# Patient Record
Sex: MALE | Race: BLACK OR AFRICAN AMERICAN | Employment: UNEMPLOYED | URBAN - METROPOLITAN AREA
[De-identification: names, ages, dates, MRNs, and addresses within clinical notes are randomized per-mention and may not be internally consistent; named-entity substitution may affect disease eponyms.]

---

## 2018-01-03 ENCOUNTER — APPOINTMENT (OUTPATIENT)
Dept: GENERAL RADIOLOGY | Age: 33
End: 2018-01-03
Attending: PHYSICIAN ASSISTANT
Payer: SELF-PAY

## 2018-01-03 ENCOUNTER — HOSPITAL ENCOUNTER (EMERGENCY)
Age: 33
Discharge: HOME | End: 2018-01-03
Attending: EMERGENCY MEDICINE
Payer: SELF-PAY

## 2018-01-03 VITALS
HEART RATE: 61 BPM | TEMPERATURE: 97.5 F | WEIGHT: 145 LBS | RESPIRATION RATE: 16 BRPM | HEIGHT: 66 IN | DIASTOLIC BLOOD PRESSURE: 63 MMHG | OXYGEN SATURATION: 98 % | SYSTOLIC BLOOD PRESSURE: 113 MMHG | BODY MASS INDEX: 23.3 KG/M2

## 2018-01-03 DIAGNOSIS — S43.101A SEPARATION OF RIGHT ACROMIOCLAVICULAR JOINT, INITIAL ENCOUNTER: Primary | ICD-10-CM

## 2018-01-03 PROCEDURE — 74011250637 HC RX REV CODE- 250/637: Performed by: PHYSICIAN ASSISTANT

## 2018-01-03 PROCEDURE — 73030 X-RAY EXAM OF SHOULDER: CPT

## 2018-01-03 PROCEDURE — 99283 EMERGENCY DEPT VISIT LOW MDM: CPT

## 2018-01-03 PROCEDURE — A4565 SLINGS: HCPCS

## 2018-01-03 RX ORDER — DICLOFENAC EPOLAMINE 0.01 G/1
1 PATCH TOPICAL EVERY 12 HOURS
Qty: 10 PATCH | Refills: 0 | Status: SHIPPED | OUTPATIENT
Start: 2018-01-03

## 2018-01-03 RX ORDER — OXYCODONE AND ACETAMINOPHEN 5; 325 MG/1; MG/1
1 TABLET ORAL
Status: COMPLETED | OUTPATIENT
Start: 2018-01-03 | End: 2018-01-03

## 2018-01-03 RX ADMIN — OXYCODONE HYDROCHLORIDE AND ACETAMINOPHEN 1 TABLET: 5; 325 TABLET ORAL at 01:50

## 2018-01-03 NOTE — LETTER
[address] [place] [address] Dr [facility] EMERGENCY DEPT 
[address] [facility] [id number] [phone] Work/School Note Date: 1/2/2018 To Whom It May concern: 
 
Dayne Perdue was seen and treated today in the emergency room by the following provider(s): 
Attending Provider: Vivian Hernandez MD 
Physician Assistant: Wayne Cummings PA-C. Dayne Perdue return to work 1/7/18 Sincerely, Wayne Cummings PA-C

## 2018-01-03 NOTE — ED NOTES
I have reviewed discharge instructions with the patient and caregiver. The patient and caregiver verbalized understanding. Patient armband removed and given to patient to take home. Patient was informed of the privacy risks if armband lost or stolen. I have reviewed the provider's instructions with the patient, answering all questions to his satisfaction.

## 2018-01-03 NOTE — DISCHARGE INSTRUCTIONS
Shoulder Separation: Care Instructions  Your Care Instructions    A shoulder separation is a tearing of the ligaments that connect two bones of the shoulder-the collarbone (clavicle) and the end of the shoulder blade (acromion). The ligaments can be partially or completely torn. This is usually caused by a blow to the top of the shoulder or a fall onto an outstretched arm. Shoulder injuries can be slow to heal, but with time and effort, your shoulder should get better. Physical therapy can help you regain strength, motion, and flexibility in your shoulder. Follow-up care is a key part of your treatment and safety. Be sure to make and go to all appointments, and call your doctor if you are having problems. It's also a good idea to know your test results and keep a list of the medicines you take. How can you care for yourself at home? · If your doctor put your arm in a sling, wear the sling as directed. Do not take it off before your doctor tells you to. · Take pain medicines exactly as directed. ¨ If the doctor gave you a prescription medicine for pain, take it as prescribed. ¨ If you are not taking a prescription pain medicine, ask your doctor if you can take an over-the-counter medicine. · Rest your shoulder as much as you can. · Put ice or a cold pack on your shoulder for 10 to 20 minutes at a time. Try to do this every 1 to 2 hours for the next 3 days (when you are awake) or until the swelling goes down. Put a thin cloth between the ice and your skin. · You may use warm packs after the first 3 days for 15 to 20 minutes at a time to ease pain. · If your doctor gave you exercises to do at home, do them exactly as instructed. · Do not do anything that makes pain worse. · Go to all follow-up appointments. You and your doctor will decide if you need further treatment, including surgery. You and your doctor will also decide when to begin physical therapy, if it is needed.   When should you call for help?  Call your doctor now or seek immediate medical care if:  ? · Your pain gets a lot worse. ? · You cannot move your arm. ? · You have new weakness, numbness, or tingling in your hand or arm. ? · Your arm or hand is cool or pale or changes color. ? · Your sling feels too tight, and you cannot loosen it. ? Watch closely for changes in your health, and be sure to contact your doctor if:  ? · You have new or increased swelling in your arm. ? · You have new pain that develops in another area of your arm. For example, you have pain in your hand or elbow. ? · You do not get better as expected. Where can you learn more? Go to http://dayanara-onelia.info/. Enter S051 in the search box to learn more about \"Shoulder Separation: Care Instructions. \"  Current as of: March 21, 2017  Content Version: 11.4  © 2087-7958 [employer]. Care instructions adapted under license by [employer] (which disclaims liability or warranty for this information). If you have questions about a medical condition or this instruction, always ask your healthcare professional. [place] [address] any warranty or liability for your use of this information. Shoulder Pain: Care Instructions  Your Care Instructions    You can hurt your shoulder by using it too much during an activity, such as fishing or baseball. It can also happen as part of the everyday wear and tear of getting older. Shoulder injuries can be slow to heal, but your shoulder should get better with time. Your doctor may recommend a sling to rest your shoulder. If you have injured your shoulder, you may need testing and treatment. Follow-up care is a key part of your treatment and safety. Be sure to make and go to all appointments, and call your doctor if you are having problems. It's also a good idea to know your test results and keep a list of the medicines you take.   How can you care for yourself at home?  · Take pain medicines exactly as directed. ¨ If the doctor gave you a prescription medicine for pain, take it as prescribed. ¨ If you are not taking a prescription pain medicine, ask your doctor if you can take an over-the-counter medicine. ¨ Do not take two or more pain medicines at the same time unless the doctor told you to. Many pain medicines contain acetaminophen, which is Tylenol. Too much acetaminophen (Tylenol) can be harmful. · If your doctor recommends that you wear a sling, use it as directed. Do not take it off before your doctor tells you to. · Put ice or a cold pack on the sore area for 10 to 20 minutes at a time. Put a thin cloth between the ice and your skin. · If there is no swelling, you can put moist heat, a heating pad, or a warm cloth on your shoulder. Some doctors suggest alternating between hot and cold. · Rest your shoulder for a few days. If your doctor recommends it, you can then begin gentle exercise of the shoulder, but do not lift anything heavy. When should you call for help? Call 911 anytime you think you may need emergency care. For example, call if:  ? · You have chest pain or pressure. This may occur with:  ¨ Sweating. ¨ Shortness of breath. ¨ Nausea or vomiting. ¨ Pain that spreads from the chest to the neck, jaw, or one or both shoulders or arms. ¨ Dizziness or lightheadedness. ¨ A fast or uneven pulse. After calling 911, chew 1 adult-strength aspirin. Wait for an ambulance. Do not try to drive yourself. ? · Your arm or hand is cool or pale or changes color. ?Call your doctor now or seek immediate medical care if:  ? · You have signs of infection, such as:  ¨ Increased pain, swelling, warmth, or redness in your shoulder. ¨ Red streaks leading from a place on your shoulder. ¨ Pus draining from an area of your shoulder. ¨ Swollen lymph nodes in your neck, armpits, or groin. ¨ A fever. ? Watch closely for changes in your health, and be sure to contact your doctor if:  ? · You cannot use your shoulder. ? · Your shoulder does not get better as expected. Where can you learn more? Go to http://dayanara-onelia.info/. Enter S448 in the search box to learn more about \"Shoulder Pain: Care Instructions. \"  Current as of: March 21, 2017  Content Version: 11.4  © 6739-3071 [employer]. Care instructions adapted under license by [employer] (which disclaims liability or warranty for this information). If you have questions about a medical condition or this instruction, always ask your healthcare professional. [place] [address] any warranty or liability for your use of this information.

## 2018-01-03 NOTE — ED PROVIDER NOTES
HPI Comments: 31yo M presenting to ED c/o right shoulder pain s/p fall while climbing fence. Pt reportedly landed on lateral shoulder and has had pain since. Limited ROM due to pain. No head injury. Denies f/c, weakness, dizziness/headache, LOC, confusion, neck pain, stiff neck, cp, palps,, cough, abd pain, n/v/d, back pain, extremity (unilateral) numbness/tingling/weakness, calf swelling/pain. Patient is a 34 y.o. male presenting with dislocation. The history is provided by the patient. Dislocation          No past medical history on file. No past surgical history on file. No family history on file. Social History     Social History    Marital status: SINGLE     Spouse name: N/A    Number of children: N/A    Years of education: N/A     Occupational History    Not on file. Social History Main Topics    Smoking status: Current Every Day Smoker     Packs/day: 0.50     Years: 5.00    Smokeless tobacco: Not on file    Alcohol use Yes      Comment:   liquor, 35   shots per week    Drug use: No    Sexual activity: Yes     Partners: Female     Birth control/ protection: Condom     Other Topics Concern    Not on file     Social History Narrative    No narrative on file         ALLERGIES: Review of patient's allergies indicates no known allergies. Review of Systems    Vitals:    01/02/18 2302   BP: 121/70   Pulse: 70   Resp: 16   Temp: 97.5 °F (36.4 °C)   SpO2: 100%   Weight: 65.8 kg (145 lb)   Height: 5' 6\" (1.676 m)            Physical Exam   Constitutional: He is oriented to person, place, and time. He appears well-developed and well-nourished. HENT:   Head: Normocephalic and atraumatic. Mouth/Throat: Oropharynx is clear and moist.   Eyes: Conjunctivae are normal.   Neck: Normal range of motion. Cardiovascular: Normal rate, regular rhythm and normal heart sounds. Pulmonary/Chest: Effort normal. No respiratory distress. He has no wheezes. He has no rales. Abdominal: Soft.  He exhibits no distension. Musculoskeletal:        Right shoulder: He exhibits decreased range of motion and bony tenderness. He exhibits no swelling, no effusion, no crepitus, no deformity, no laceration, no pain, no spasm, normal pulse and normal strength. Radial pulse 2+ and sym. Normal sensation. Laterosuperior shoulder TTP. No deformity appreciated. Neurological: He is oriented to person, place, and time. Nursing note and vitals reviewed. MDM  Number of Diagnoses or Management Options  AC separation, type 2, left, initial encounter:     ED Course       Procedures    Medications ordered:   Medications   oxyCODONE-acetaminophen (PERCOCET) 5-325 mg per tablet 1 Tab (1 Tab Oral Given 1/3/18 0150)         Lab findings:  No results found for this or any previous visit (from the past 12 hour(s)). X-Ray, CT or other radiology findings or impressions:  Xr Shoulder Rt Ap/lat Min 2 V    Result Date: 1/3/2018  Procedure:  Right shoulder series. Indication:  Kathrynn Found from a fence and dislocated shoulder. Felt a pop upon impact. Comparison:  None. Findings:  Internal rotation AP view and scapula Y view are submitted. No evidence of acute fracture or subluxation is identified in the glenohumeral joint. Instead, the acromioclavicular joint appears abnormally widened, measuring up to about 1.0 cm in maximal width. There is also associated slight step-off. Impression: 1. No evidence of glenohumeral joint dislocation. 2.  Abnormal AC joint widening and slight joint alignment step-off. Suggestive of grade 2-3 separation. Progress notes, Consult notes or additional Procedure notes:   Xray reviewed with Dr. Janessa Rock- agrees with plan to put arm in sling and have pt f/u with ortho in 2-3 days. Pt given sling with instructions and will f/u as directed. Dispo:  Patient was d/c in stable condition.       Return to the ER if you are unable to obtain referral as directed.        Jolie Gustafson 's results have been reviewed with him. They have been counseled regarding the diagnosis, treatment, and plan. They verbally convey understanding and agreement of the signs, symptoms, diagnosis, treatment and prognosis and additionally agrees to follow up as discussed. They also agrees with the care-plan and conveys that all of her questions have been answered. I have also provided discharge instructions for her that include: educational information regarding their diagnosis and treatment, and list of reasons why they would want to return to the ED prior to their follow-up appointment, should the condition change. Eliel Landeros PA-C    Diagnosis:   1. AC separation, type 2, left, initial encounter        Follow-up Information     Follow up With Details Comments Kayli Corral MD Schedule an appointment as soon as possible for a visit in 1 week  [facility].      [facility] EMERGENCY DEPT  As needed, If symptoms worsen [address] [facility] [address]  [phone]           Patient's Medications   Start Taking    DICLOFENAC (FLECTOR) 1.3 % PT12    1 Patch by TransDERmal route every twelve (12) hours every twelve (12) hours. Continue Taking    IBUPROFEN (MOTRIN) 200 MG TABLET    Take 200 mg by mouth. MUPIROCIN (BACTROBAN) 2 % OINTMENT    Apply  to affected area two (2) times a day. OXYCODONE-ACETAMINOPHEN (PERCOCET) 5-325 MG PER TABLET    Take 1 Tab by mouth every four (4) hours as needed (BREAKTHROUGH PAIN ONLY, do not fill unless Bactrim DS and Keflex are filled. ).    These Medications have changed    No medications on file   Stop Taking    No medications on file

## 2018-01-09 ENCOUNTER — OFFICE VISIT (OUTPATIENT)
Dept: ORTHOPEDIC SURGERY | Age: 33
End: 2018-01-09

## 2018-01-09 VITALS
SYSTOLIC BLOOD PRESSURE: 126 MMHG | BODY MASS INDEX: 24.11 KG/M2 | HEART RATE: 63 BPM | HEIGHT: 66 IN | TEMPERATURE: 97.8 F | DIASTOLIC BLOOD PRESSURE: 75 MMHG | WEIGHT: 150 LBS | OXYGEN SATURATION: 99 %

## 2018-01-09 DIAGNOSIS — S43.101A ACROMIOCLAVICULAR JOINT SEPARATION, TYPE 2, RIGHT, INITIAL ENCOUNTER: Primary | ICD-10-CM

## 2018-01-09 RX ORDER — HYDROCODONE BITARTRATE AND ACETAMINOPHEN 5; 325 MG/1; MG/1
1 TABLET ORAL
Qty: 25 TAB | Refills: 0 | Status: SHIPPED | OUTPATIENT
Start: 2018-01-09

## 2018-01-09 NOTE — PROGRESS NOTES
Andriy Hunt  1985   Chief Complaint   Patient presents with    Shoulder Pain     RIGHT        HISTORY OF PRESENT ILLNESS  Andriy Hunt is a 34 y.o. male who presents today for evaluation of right shoulder pain. he rates his pain 10/10 today. Pain has been present since he had a fall off of a ladder onto his right shoulder. Patient was seen in the ED 1/3/18. Presents today in a sling. Patient describes the pain as aching, sharp and stabbing that is Intermittent in nature. Symptoms are worse with Activity, Work, movements of the shoulder, and is better with  Rest. Associated symptoms include weakness, soreness. Since problem started, it: is unchanged. Pain does not wake patient up at night. Has taken no recent medications for the problem. He works in [email or web address]. Has tried following treatments: Injections:NO; Brace:NO; Therapy:NO; Cane/Crutch:NO       No Known Allergies     History reviewed. No pertinent past medical history. Social History     Social History    Marital status: UNKNOWN     Spouse name: N/A    Number of children: N/A    Years of education: N/A     Occupational History    Not on file. Social History Main Topics    Smoking status: Current Every Day Smoker     Packs/day: 0.50     Years: 5.00    Smokeless tobacco: Never Used    Alcohol use Yes      Comment:   liquor, 35   shots per week    Drug use: No    Sexual activity: Yes     Partners: Female     Birth control/ protection: Condom     Other Topics Concern    Not on file     Social History Narrative      History reviewed. No pertinent surgical history. History reviewed. No pertinent family history. Current Outpatient Prescriptions   Medication Sig    HYDROcodone-acetaminophen (NORCO) 5-325 mg per tablet Take 1 Tab by mouth every eight (8) hours as needed for Pain. Max Daily Amount: 3 Tabs.  ibuprofen (MOTRIN) 200 mg tablet Take 200 mg by mouth.     diclofenac (FLECTOR) 1.3 % pt12 1 Patch by TransDERmal route every twelve (12) hours every twelve (12) hours.  mupirocin (BACTROBAN) 2 % ointment Apply  to affected area two (2) times a day.  oxyCODONE-acetaminophen (PERCOCET) 5-325 mg per tablet Take 1 Tab by mouth every four (4) hours as needed (BREAKTHROUGH PAIN ONLY, do not fill unless Bactrim DS and Keflex are filled. ). No current facility-administered medications for this visit. REVIEW OF SYSTEM   Patient denies: Weight loss, Fever/Chills, HA, Visual changes, Fatigue, Chest pain, SOB, Abdominal pain, N/V/D/C, Blood in stool or urine, Edema. Pertinent positive as above in HPI. All others were negative    PHYSICAL EXAM:   Visit Vitals    /75    Pulse 63    Temp 97.8 °F (36.6 °C) (Oral)    Ht 5' 6\" (1.676 m)    Wt 150 lb (68 kg)    SpO2 99%    BMI 24.21 kg/m2     The patient is a well-developed, well-nourished male   in no acute distress. The patient is alert and oriented times three. The patient is alert and oriented times three. Mood and affect are normal.  LYMPHATIC: lymph nodes are not enlarged and are within normal limits  SKIN: normal in color and non tender to palpation. There are no bruises or abrasions noted. NEUROLOGICAL: Motor sensory exam is within normal limits. Reflexes are equal bilaterally. There is normal sensation to pinprick and light touch  MUSCULOSKELETAL:  Examination Right shoulder   Skin Intact   AC joint tenderness +   Biceps tenderness -   Forward flexion/Elevation    Active abduction    Glenohumeral abduction 45   External rotation ROM 30   Internal rotation ROM 30   Apprehension -   Marys Relocation -   Jerk -   Load and Shift -   Obriens -   Speeds -   Impingement sign -   Supraspinatus/Empty Can -, 5/5   External Rotation Strength -, 5/5   Lift Off/Belly Press -, 5/5   Neurovascular Intact       IMAGING: XR of the right shoulder dated 1/3/18 was reviewed and read: Impression:  1.   No evidence of glenohumeral joint dislocation. 2.  Abnormal AC joint widening and slight joint alignment step-off. Suggestive  of grade 2-3 separation. IMPRESSION:      ICD-10-CM ICD-9-CM    1. Acromioclavicular joint separation, type 2, right, initial encounter S43.101A 831.04 HYDROcodone-acetaminophen (NORCO) 5-325 mg per tablet        PLAN:  1. Patient has an XR documented grade II joint separation. This type of separation usually does not require surgery. I think he can be treated with a sling, ice, PT. I instructed him not to actively move the arm for the next week. Risk factors include: n/a  2. No cortisone injection indicated today   3. No Physical/Occupational Therapy indicated today  4. No diagnostic test indicated today  5. No durable medical equipment indicated today  6. No referral indicated today   7. Yes medications indicated today Adolm Tafoya 5  8. Yes Narcotic indicated today Patient given pain medication for short term acute pain relief. Goal is to treat patient according to above plan and to ultimately have patient off all pain medications once appropriate. If chronic pain management is required beyond what is expected for current orthopedic problem, will refer patient to pain management.  was reviewed and will be reviewed with every medication refill request.     RTC 1 week for XR  Follow-up Disposition: Not on File    Scribed by Geoff Saldaña [address]) as dictated by Corinna Ortiz MD    I, Dr. Corinna Ortiz, confirm that all documentation is accurate.     Corinna Ortiz M.D.   [facility]

## 2018-01-16 ENCOUNTER — OFFICE VISIT (OUTPATIENT)
Dept: ORTHOPEDIC SURGERY | Age: 33
End: 2018-01-16

## 2018-01-16 DIAGNOSIS — S43.101D: Primary | ICD-10-CM

## 2018-01-16 NOTE — PROGRESS NOTES
Gisselle Sun  1985   No chief complaint on file. HISTORY OF PRESENT ILLNESS  Gisselle Sun is a 34 y.o. male who presents today for reevaluation of right shoulder pain. Patient rates pain as 10/10 today. At last OV, patient was prescribed Norco 5 and instructed to ice and use a sling. Initial injury occurred subsequent to falling off a ladder 1/3/18. He continues to use the sling. He has a lot of aggravating pain especially in the morning. Patient denies any fever, chills, chest pain, shortness of breath or calf pain. There are no new illness or injuries to report since last seen in the office. There are no changes to medications, allergies, family or social history. PHYSICAL EXAM:   There were no vitals taken for this visit. The patient is a well-developed, well-nourished male   in no acute distress. The patient is alert and oriented times three. The patient is alert and oriented times three. Mood and affect are normal.  LYMPHATIC: lymph nodes are not enlarged and are within normal limits  SKIN: normal in color and non tender to palpation. There are no bruises or abrasions noted. NEUROLOGICAL: Motor sensory exam is within normal limits. Reflexes are equal bilaterally.  There is normal sensation to pinprick and light touch  MUSCULOSKELETAL:  Examination Right shoulder   Skin Intact   AC joint tenderness +   Biceps tenderness -   Forward flexion/Elevation    Active abduction    Glenohumeral abduction 60   External rotation ROM 30   Internal rotation ROM 30   Apprehension -   Marys Relocation -   Jerk -   Load and Shift -   Obriens -   Speeds -   Impingement sign -   Supraspinatus/Empty Can -, 5/5   External Rotation Strength -, 5/5   Lift Off/Belly Press -, 5/5   Neurovascular Intact        IMAGING: XR of the right shoulder dated 1/16/18 was reviewed and read: slight superior prominence in the [facility] joint    XR of the right shoulder dated 1/3/18 was reviewed and read:   Impression:  1.  No evidence of glenohumeral joint dislocation. 2.  Abnormal AC joint widening and slight joint alignment step-off.  Suggestive  of grade 2-3 separation. IMPRESSION:      ICD-10-CM ICD-9-CM    1. Acromioclavicular joint separation, type 2, right, subsequent encounter S43.101D V58.89 AMB POC XRAY, SHOULDER; COMPLETE, 2+     831.04 REFERRAL TO PHYSICAL THERAPY        PLAN:   1. Patient's right shoulder XR has progressed well since last week. It is time to begin working on ROM at PT. I instructed him to discontinue use of the sling except for occasional use for comfort. Risk factors include: n/a  2. No cortisone injection indicated today   3. Yes Physical Therapy indicated today PROM and AROM  4. No diagnostic test indicated today  5. No durable medical equipment indicated today  6. No referral indicated today   7. No medications indicated today  8. No Narcotic indicated today        RTC 4 weeks  Follow-up Disposition: Not on File    Scribed by Wilton Corey [address]) as dictated by Jac Hamm MD    I, Dr. Jac Hamm, confirm that all documentation is accurate.     Jac Hamm M.D.   [facility]